# Patient Record
Sex: FEMALE | Employment: OTHER | URBAN - METROPOLITAN AREA
[De-identification: names, ages, dates, MRNs, and addresses within clinical notes are randomized per-mention and may not be internally consistent; named-entity substitution may affect disease eponyms.]

---

## 2023-12-18 DIAGNOSIS — M81.0 OSTEOPOROSIS, POSTMENOPAUSAL: Primary | ICD-10-CM

## 2023-12-18 NOTE — PROGRESS NOTES
Spoke with patient regarding Prolia injection.      Scheduled for 12/29/23, medication order request submitted.

## 2023-12-20 ENCOUNTER — LAB (OUTPATIENT)
Dept: LAB | Facility: LAB | Age: 88
End: 2023-12-20
Payer: MEDICARE

## 2023-12-20 DIAGNOSIS — M81.0 OSTEOPOROSIS, POSTMENOPAUSAL: ICD-10-CM

## 2023-12-20 LAB
ANION GAP SERPL CALC-SCNC: 13 MMOL/L (ref 10–20)
BUN SERPL-MCNC: 23 MG/DL (ref 6–23)
CALCIUM SERPL-MCNC: 9.4 MG/DL (ref 8.6–10.3)
CHLORIDE SERPL-SCNC: 101 MMOL/L (ref 98–107)
CO2 SERPL-SCNC: 29 MMOL/L (ref 21–32)
CREAT SERPL-MCNC: 0.63 MG/DL (ref 0.5–1.05)
GFR SERPL CREATININE-BSD FRML MDRD: 86 ML/MIN/1.73M*2
GLUCOSE SERPL-MCNC: 100 MG/DL (ref 74–99)
POTASSIUM SERPL-SCNC: 3.9 MMOL/L (ref 3.5–5.3)
SODIUM SERPL-SCNC: 139 MMOL/L (ref 136–145)

## 2023-12-20 PROCEDURE — 36415 COLL VENOUS BLD VENIPUNCTURE: CPT

## 2023-12-22 DIAGNOSIS — M81.0 AGE-RELATED OSTEOPOROSIS WITHOUT CURRENT PATHOLOGICAL FRACTURE: Primary | ICD-10-CM

## 2023-12-29 ENCOUNTER — CLINICAL SUPPORT (OUTPATIENT)
Dept: PRIMARY CARE | Facility: CLINIC | Age: 88
End: 2023-12-29
Payer: MEDICARE

## 2023-12-29 VITALS
HEART RATE: 68 BPM | TEMPERATURE: 98.4 F | WEIGHT: 121 LBS | OXYGEN SATURATION: 98 % | BODY MASS INDEX: 25.29 KG/M2 | DIASTOLIC BLOOD PRESSURE: 80 MMHG | SYSTOLIC BLOOD PRESSURE: 120 MMHG

## 2023-12-29 PROCEDURE — 96372 THER/PROPH/DIAG INJ SC/IM: CPT | Performed by: FAMILY MEDICINE

## 2023-12-29 RX ORDER — HYDROCHLOROTHIAZIDE 25 MG/1
25 TABLET ORAL DAILY
COMMUNITY
End: 2024-05-30

## 2024-04-11 ENCOUNTER — TELEPHONE (OUTPATIENT)
Dept: PRIMARY CARE | Facility: CLINIC | Age: 89
End: 2024-04-11
Payer: MEDICARE

## 2024-04-11 DIAGNOSIS — D64.9 ANEMIA, UNSPECIFIED TYPE: ICD-10-CM

## 2024-04-11 DIAGNOSIS — I10 PRIMARY HYPERTENSION: ICD-10-CM

## 2024-04-11 DIAGNOSIS — Z13.220 LIPID SCREENING: ICD-10-CM

## 2024-04-11 DIAGNOSIS — E55.9 VITAMIN D DEFICIENCY: ICD-10-CM

## 2024-04-11 PROBLEM — M81.0 OSTEOPOROSIS: Status: ACTIVE | Noted: 2022-05-19

## 2024-05-30 DIAGNOSIS — I10 PRIMARY HYPERTENSION: ICD-10-CM

## 2024-05-30 RX ORDER — HYDROCHLOROTHIAZIDE 25 MG/1
25 TABLET ORAL DAILY
Qty: 90 TABLET | Refills: 3 | Status: SHIPPED | OUTPATIENT
Start: 2024-05-30

## 2024-06-10 ENCOUNTER — TELEPHONE (OUTPATIENT)
Dept: PHARMACY | Facility: HOSPITAL | Age: 89
End: 2024-06-10
Payer: MEDICARE

## 2024-06-10 NOTE — TELEPHONE ENCOUNTER
LVM#1 with pt to schedule Prolia injection.    Pt has an OV with [facility] on 7/5.  Can add Prolia to that visit if pt is agreeable.

## 2024-06-21 ENCOUNTER — DOCUMENTATION (OUTPATIENT)
Dept: PHARMACY | Facility: HOSPITAL | Age: 89
End: 2024-06-21
Payer: MEDICARE

## 2024-06-21 DIAGNOSIS — M81.0 OSTEOPOROSIS, POSTMENOPAUSAL: Primary | ICD-10-CM

## 2024-06-21 NOTE — PROGRESS NOTES
PROLIA NOTES  Prolia Injection  , M81.0    Mesha Tom is a 89 y.o. female who was referred by Carey Kimbrough MD for in-office injection of Prolia.   Spoke with patiet via telephone today.  Patient verbally consented to proceed with Osteoporosis treatment/Prolia.     Calcium   Date Value Ref Range Status   12/20/2023 9.4 8.6 - 10.3 mg/dL Final     Vitamin D, 25-Hydroxy   Date Value Ref Range Status   06/23/2023 42 31 - 100 ng/mL Final     Comment:     Performed at [address] [address]     eGFR   Date Value Ref Range Status   12/20/2023 86 >60 mL/min/1.73m*2 Final     Comment:     Calculations of estimated GFR are performed using the 2021 CKD-EPI Study Refit equation without the race variable for the IDMS-Traceable creatinine methods.  https://jasn.asnjournals.org/content/early/2021/09/22/ASN.3701284169        Last DEXA scan:  03/23/2022  FINDINGS:   Lumbar T-Score: -2.3    Z- Score: 0.8  Left femoral neck T-Score: -3.9  Z- Score: -1.4  Total left hip T score: -4.2 Z score:  -1.9     10 year fracture risk as assessed by the FRAX WHO fracture risk assessment tool:    1. Risk of major osteoporotic fracture is 28%.    2. Risk of hip fracture is 14%.  (FRAX Version 3.05. Fracture probability calculated for an untreated  patient.  Fracture probability may be lower if the patient has received  treatment)     IMPRESSION:  1. Lumbar spine evaluation demonstrates osteoporosis.   There has been a 4.1  %  decrease since the prior exam.  2. The hip evaluation demonstrates osteoporosis.  There has been a 22.5 %  decrease since the prior exam.      Next dose due: 06/29/24    PA needed? No    Prolia injection via: Buy/Bill  Authorization number:    Pharmacy to be used: [facility]    Bloodwork ordered? Yes    Date: 06/21/24  Medication ordered? Yes    Date: 06/21/24  Medication Received? No    Appointment scheduled? Yes    Date: with CPE on 7/5      Other Notes:  Discussed cost, MOA, potential SE,  pre-injection bloodwork, post injection bloodwork, instructed to call office after injection with any signs of hypocalcemia.

## 2024-06-25 ENCOUNTER — LAB (OUTPATIENT)
Dept: LAB | Facility: LAB | Age: 89
End: 2024-06-25
Payer: MEDICARE

## 2024-06-25 DIAGNOSIS — M81.0 OSTEOPOROSIS, POSTMENOPAUSAL: ICD-10-CM

## 2024-06-25 DIAGNOSIS — Z13.220 LIPID SCREENING: ICD-10-CM

## 2024-06-25 DIAGNOSIS — D64.9 ANEMIA, UNSPECIFIED TYPE: ICD-10-CM

## 2024-06-25 DIAGNOSIS — E55.9 VITAMIN D DEFICIENCY: ICD-10-CM

## 2024-06-25 DIAGNOSIS — I10 PRIMARY HYPERTENSION: ICD-10-CM

## 2024-06-25 LAB
25(OH)D3 SERPL-MCNC: 31 NG/ML (ref 31–100)
ALBUMIN SERPL-MCNC: 3.5 G/DL (ref 3.5–5)
ALP BLD-CCNC: 136 U/L (ref 35–125)
ALT SERPL-CCNC: 10 U/L (ref 5–40)
ANION GAP SERPL CALC-SCNC: 9 MMOL/L
AST SERPL-CCNC: 22 U/L (ref 5–40)
BASOPHILS # BLD AUTO: 0.11 X10*3/UL (ref 0–0.1)
BASOPHILS NFR BLD AUTO: 1 %
BILIRUB SERPL-MCNC: 0.3 MG/DL (ref 0.1–1.2)
BUN SERPL-MCNC: 16 MG/DL (ref 8–25)
CALCIUM SERPL-MCNC: 9.1 MG/DL (ref 8.5–10.4)
CHLORIDE SERPL-SCNC: 99 MMOL/L (ref 97–107)
CHOLEST SERPL-MCNC: 176 MG/DL (ref 133–200)
CHOLEST/HDLC SERPL: 4.2 {RATIO}
CO2 SERPL-SCNC: 31 MMOL/L (ref 24–31)
CREAT SERPL-MCNC: 0.6 MG/DL (ref 0.4–1.6)
EGFRCR SERPLBLD CKD-EPI 2021: 86 ML/MIN/1.73M*2
EOSINOPHIL # BLD AUTO: 0.41 X10*3/UL (ref 0–0.4)
EOSINOPHIL NFR BLD AUTO: 3.8 %
ERYTHROCYTE [DISTWIDTH] IN BLOOD BY AUTOMATED COUNT: 13.2 % (ref 11.5–14.5)
GLUCOSE SERPL-MCNC: 101 MG/DL (ref 65–99)
HCT VFR BLD AUTO: 43.3 % (ref 36–46)
HDLC SERPL-MCNC: 42 MG/DL
HGB BLD-MCNC: 13.8 G/DL (ref 12–16)
IMM GRANULOCYTES # BLD AUTO: 0.06 X10*3/UL (ref 0–0.5)
IMM GRANULOCYTES NFR BLD AUTO: 0.6 % (ref 0–0.9)
LDLC SERPL CALC-MCNC: 107 MG/DL (ref 65–130)
LYMPHOCYTES # BLD AUTO: 1.92 X10*3/UL (ref 0.8–3)
LYMPHOCYTES NFR BLD AUTO: 17.8 %
MCH RBC QN AUTO: 29.9 PG (ref 26–34)
MCHC RBC AUTO-ENTMCNC: 31.9 G/DL (ref 32–36)
MCV RBC AUTO: 94 FL (ref 80–100)
MONOCYTES # BLD AUTO: 1.04 X10*3/UL (ref 0.05–0.8)
MONOCYTES NFR BLD AUTO: 9.6 %
NEUTROPHILS # BLD AUTO: 7.26 X10*3/UL (ref 1.6–5.5)
NEUTROPHILS NFR BLD AUTO: 67.2 %
NRBC BLD-RTO: 0 /100 WBCS (ref 0–0)
PLATELET # BLD AUTO: 338 X10*3/UL (ref 150–450)
POTASSIUM SERPL-SCNC: 3 MMOL/L (ref 3.4–5.1)
PROT SERPL-MCNC: 6 G/DL (ref 5.9–7.9)
RBC # BLD AUTO: 4.62 X10*6/UL (ref 4–5.2)
SODIUM SERPL-SCNC: 139 MMOL/L (ref 133–145)
TRIGL SERPL-MCNC: 134 MG/DL (ref 40–150)
WBC # BLD AUTO: 10.8 X10*3/UL (ref 4.4–11.3)

## 2024-06-25 PROCEDURE — 36415 COLL VENOUS BLD VENIPUNCTURE: CPT

## 2024-06-25 PROCEDURE — 80061 LIPID PANEL: CPT

## 2024-06-25 PROCEDURE — 80053 COMPREHEN METABOLIC PANEL: CPT

## 2024-06-25 PROCEDURE — 85025 COMPLETE CBC W/AUTO DIFF WBC: CPT

## 2024-06-25 PROCEDURE — 82306 VITAMIN D 25 HYDROXY: CPT

## 2024-07-03 PROBLEM — K27.7 CHRONIC PEPTIC ULCER WITHOUT HEMORRHAGE OR PERFORATION: Status: RESOLVED | Noted: 2018-10-26 | Resolved: 2024-07-03

## 2024-07-03 PROBLEM — B02.9 HERPES ZOSTER: Status: RESOLVED | Noted: 2019-07-08 | Resolved: 2024-07-03

## 2024-07-05 ENCOUNTER — APPOINTMENT (OUTPATIENT)
Dept: PRIMARY CARE | Facility: CLINIC | Age: 89
End: 2024-07-05
Payer: MEDICARE

## 2024-07-05 VITALS
BODY MASS INDEX: 27.29 KG/M2 | DIASTOLIC BLOOD PRESSURE: 82 MMHG | OXYGEN SATURATION: 97 % | WEIGHT: 130 LBS | HEART RATE: 68 BPM | HEIGHT: 58 IN | SYSTOLIC BLOOD PRESSURE: 162 MMHG | TEMPERATURE: 97.2 F

## 2024-07-05 DIAGNOSIS — I10 PRIMARY HYPERTENSION: ICD-10-CM

## 2024-07-05 DIAGNOSIS — E55.9 VITAMIN D DEFICIENCY: ICD-10-CM

## 2024-07-05 DIAGNOSIS — M81.0 OSTEOPOROSIS, UNSPECIFIED OSTEOPOROSIS TYPE, UNSPECIFIED PATHOLOGICAL FRACTURE PRESENCE: ICD-10-CM

## 2024-07-05 DIAGNOSIS — Z00.00 ENCOUNTER FOR ANNUAL WELLNESS EXAM IN MEDICARE PATIENT: Primary | ICD-10-CM

## 2024-07-05 PROBLEM — D64.9 ANEMIA: Status: RESOLVED | Noted: 2018-08-20 | Resolved: 2024-07-05

## 2024-07-05 LAB
POC APPEARANCE, URINE: ABNORMAL
POC BILIRUBIN, URINE: NEGATIVE
POC BLOOD, URINE: ABNORMAL
POC COLOR, URINE: YELLOW
POC GLUCOSE, URINE: NEGATIVE MG/DL
POC KETONES, URINE: NEGATIVE MG/DL
POC LEUKOCYTES, URINE: ABNORMAL
POC NITRITE,URINE: NEGATIVE
POC PH, URINE: 7 PH
POC PROTEIN, URINE: NEGATIVE MG/DL
POC SPECIFIC GRAVITY, URINE: 1.01
POC UROBILINOGEN, URINE: 0.2 EU/DL

## 2024-07-05 PROCEDURE — 3079F DIAST BP 80-89 MM HG: CPT | Performed by: FAMILY MEDICINE

## 2024-07-05 PROCEDURE — 1036F TOBACCO NON-USER: CPT | Performed by: FAMILY MEDICINE

## 2024-07-05 PROCEDURE — 99212 OFFICE O/P EST SF 10 MIN: CPT | Performed by: FAMILY MEDICINE

## 2024-07-05 PROCEDURE — 81002 URINALYSIS NONAUTO W/O SCOPE: CPT | Performed by: FAMILY MEDICINE

## 2024-07-05 PROCEDURE — G0439 PPPS, SUBSEQ VISIT: HCPCS | Performed by: FAMILY MEDICINE

## 2024-07-05 PROCEDURE — 1170F FXNL STATUS ASSESSED: CPT | Performed by: FAMILY MEDICINE

## 2024-07-05 PROCEDURE — 1159F MED LIST DOCD IN RCRD: CPT | Performed by: FAMILY MEDICINE

## 2024-07-05 PROCEDURE — 1126F AMNT PAIN NOTED NONE PRSNT: CPT | Performed by: FAMILY MEDICINE

## 2024-07-05 PROCEDURE — 1160F RVW MEDS BY RX/DR IN RCRD: CPT | Performed by: FAMILY MEDICINE

## 2024-07-05 PROCEDURE — 3077F SYST BP >= 140 MM HG: CPT | Performed by: FAMILY MEDICINE

## 2024-07-05 PROCEDURE — 96372 THER/PROPH/DIAG INJ SC/IM: CPT | Performed by: FAMILY MEDICINE

## 2024-07-05 RX ORDER — AMLODIPINE BESYLATE 5 MG/1
5 TABLET ORAL DAILY
Qty: 90 TABLET | Refills: 1 | Status: SHIPPED | OUTPATIENT
Start: 2024-07-05 | End: 2025-01-01

## 2024-07-05 ASSESSMENT — LIFESTYLE VARIABLES
3_OR_MORE_DRINKS_PER_DAY: N
CURRENT_SMOKER: N

## 2024-07-05 ASSESSMENT — ENCOUNTER SYMPTOMS
NAUSEA: 0
TROUBLE SWALLOWING: 0
UNEXPECTED WEIGHT CHANGE: 0
FEVER: 0
ABDOMINAL PAIN: 0
ARTHRALGIAS: 0
SORE THROAT: 0
DYSPHORIC MOOD: 0
RHINORRHEA: 0
NUMBNESS: 0
SHORTNESS OF BREATH: 0
CHILLS: 0
MYALGIAS: 0
DIZZINESS: 0
DYSURIA: 0
BLOOD IN STOOL: 0
VOMITING: 0
NERVOUS/ANXIOUS: 0
COUGH: 0
WEAKNESS: 0
HEMATURIA: 0

## 2024-07-05 ASSESSMENT — ACTIVITIES OF DAILY LIVING (ADL)
TAKING_MEDICATION: INDEPENDENT
DOING_HOUSEWORK: INDEPENDENT
BATHING: INDEPENDENT
MANAGING_FINANCES: INDEPENDENT
GROCERY_SHOPPING: INDEPENDENT
DRESSING: INDEPENDENT

## 2024-07-05 ASSESSMENT — PATIENT HEALTH QUESTIONNAIRE - PHQ9
2. FEELING DOWN, DEPRESSED OR HOPELESS: NOT AT ALL
SUM OF ALL RESPONSES TO PHQ9 QUESTIONS 1 AND 2: 0
1. LITTLE INTEREST OR PLEASURE IN DOING THINGS: NOT AT ALL

## 2024-07-05 ASSESSMENT — PAIN SCALES - GENERAL: PAINLEVEL: 0-NO PAIN

## 2024-07-05 NOTE — PROGRESS NOTES
"Subjective   Reason for Visit: Mesha Tom is an 89 y.o. female here for a Medicare Wellness visit.     Past Medical, Surgical, and Family History reviewed and updated in chart.    Reviewed all medications by prescribing practitioner or clinical pharmacist (such as prescriptions, OTCs, herbal therapies and supplements) and documented in the medical record.    HPI    Patient Care Team:  Carey Kimbrough MD as PCP - General (Family Medicine)   Mesha Presents for Annual Wellness Visit. PMHx, FMHx, SHx, and Social history reviewed and updated in chart. PHQ2 reviewed. Mini-cog test reviewed. Advanced Care planning reviewed.  GYN - TAHBSO secondary to DUB  HTN - stable on HCTZ, no chest pain, no palpitations, no claudication  GERD - compliant with Pepcid AC, avoiding triggers, hx stomach ulcer required surgery  OP - started on Prolia 2022, doing well without any issues    Review of Systems   Constitutional:  Negative for chills, fever and unexpected weight change.   HENT:  Negative for congestion, ear pain, hearing loss, rhinorrhea, sore throat and trouble swallowing.    Eyes:  Negative for visual disturbance.   Respiratory:  Negative for cough and shortness of breath.    Cardiovascular:  Negative for chest pain and leg swelling.   Gastrointestinal:  Negative for abdominal pain, blood in stool, nausea and vomiting.   Genitourinary:  Negative for dysuria, hematuria, vaginal bleeding and vaginal discharge.   Musculoskeletal:  Negative for arthralgias and myalgias.   Skin:  Negative for rash.   Neurological:  Negative for dizziness, weakness and numbness.   Psychiatric/Behavioral:  Negative for dysphoric mood. The patient is not nervous/anxious.        Objective   Vitals:  /82 (BP Location: Left arm, Patient Position: Sitting)   Pulse 68   Temp 36.2 °C (97.2 °F) (Temporal)   Ht 1.473 m (4' 10\")   Wt 59 kg (130 lb)   SpO2 97%   BMI 27.17 kg/m²       Physical Exam  Vitals and nursing note reviewed. "   Constitutional:       General: She is not in acute distress.  HENT:      Right Ear: Tympanic membrane and ear canal normal.      Left Ear: Tympanic membrane and ear canal normal.      Nose: Nose normal.      Mouth/Throat:      Mouth: Mucous membranes are moist.   Eyes:      Extraocular Movements: Extraocular movements intact.      Pupils: Pupils are equal, round, and reactive to light.   Neck:      Vascular: No carotid bruit.   Cardiovascular:      Rate and Rhythm: Normal rate and regular rhythm.      Heart sounds: Murmur (RUSB) heard.      Systolic murmur is present with a grade of 2/6.   Pulmonary:      Effort: Pulmonary effort is normal.      Breath sounds: Normal breath sounds.   Abdominal:      General: Abdomen is flat.      Palpations: Abdomen is soft.      Tenderness: There is no abdominal tenderness.   Musculoskeletal:         General: Normal range of motion.      Right lower leg: No edema.      Left lower leg: No edema.   Lymphadenopathy:      Cervical: No cervical adenopathy.   Skin:     General: Skin is warm.      Findings: No rash.   Neurological:      General: No focal deficit present.      Mental Status: She is alert.   Psychiatric:         Mood and Affect: Mood normal.         Assessment/Plan   Problem List Items Addressed This Visit       Primary hypertension    Current Assessment & Plan     Uncontrolled.  Continue current hydrochlorothiazide as prescribed.  Add amlodipine 5 mg daily.  Indications, benefits and potential side effects discussed with patient and daughter.  Plan to recheck in 3 months.  DASH diet. Exercise at least 4 times per week.            Relevant Medications    amLODIPine (Norvasc) 5 mg tablet    Other Relevant Orders    POCT UA (nonautomated) manually resulted (Completed)    Osteoporosis    Current Assessment & Plan     Tolerating Prolia well.  Plan to recheck DEXA.  Continue with weightbearing exercises         Relevant Orders    XR DEXA bone density axial skeleton w VFA     Vitamin D deficiency     Other Visit Diagnoses       Encounter for annual wellness exam in Medicare patient    -  Primary    Routine general medical examination at health care facility        Relevant Orders    1 Year Follow Up In Primary Care - Wellness Exam

## 2024-07-05 NOTE — ASSESSMENT & PLAN NOTE
Uncontrolled.  Continue current hydrochlorothiazide as prescribed.  Add amlodipine 5 mg daily.  Indications, benefits and potential side effects discussed with patient and daughter.  Plan to recheck in 3 months.  DASH diet. Exercise at least 4 times per week.

## 2024-07-05 NOTE — PROGRESS NOTES
Prolia Injection  , M81.0    Mesha Tom is a 89 y.o. female who was referred by Carey Kimbrough MD for in-office injection of Prolia.   Patient verbally consented to receive Prolia.      Injection given today and documented in medical record.  Patient was provided with Prolia REMS handout and instructed to call office with any signs of hypocalcemia.    Prolia injection supplied by office - medication charge IS needed     [unfilled]    Lab Results   Component Value Date    CALCIUM 9.1 06/25/2024    CALCIUM 9.4 12/20/2023    EGFR 86 06/25/2024    EGFR 86 12/20/2023         Dilma Fuentes LPN

## 2024-07-09 ENCOUNTER — HOSPITAL ENCOUNTER (OUTPATIENT)
Dept: RADIOLOGY | Facility: CLINIC | Age: 89
Discharge: HOME | End: 2024-07-09
Payer: MEDICARE

## 2024-07-09 DIAGNOSIS — M81.0 OSTEOPOROSIS, UNSPECIFIED OSTEOPOROSIS TYPE, UNSPECIFIED PATHOLOGICAL FRACTURE PRESENCE: ICD-10-CM

## 2024-07-09 PROCEDURE — 77085 DXA BONE DENSITY AXL VRT FX: CPT | Performed by: RADIOLOGY

## 2024-07-09 PROCEDURE — 77085 DXA BONE DENSITY AXL VRT FX: CPT

## 2024-07-19 ENCOUNTER — TELEPHONE (OUTPATIENT)
Dept: PRIMARY CARE | Facility: CLINIC | Age: 89
End: 2024-07-19
Payer: MEDICARE

## 2024-07-19 NOTE — TELEPHONE ENCOUNTER
Of course! She is scheduled for Monday morning. Her daughter wants to know if she should continue the BP meds over the weekend?

## 2024-07-19 NOTE — TELEPHONE ENCOUNTER
This [facility] patient was told to call if she notices any swelling after they changed her BP medicine. Her daughter Nanette [phone] called in to report her leg has been swollen for a week now.

## 2024-07-19 NOTE — TELEPHONE ENCOUNTER
Pt has appt 7/29 , due to swelling from bp meds, is she to continue the meds or stop till the appt ? Please advise  davon , daughter [phone]

## 2024-07-22 ENCOUNTER — TELEPHONE (OUTPATIENT)
Dept: PRIMARY CARE | Facility: CLINIC | Age: 89
End: 2024-07-22

## 2024-07-22 ENCOUNTER — APPOINTMENT (OUTPATIENT)
Dept: PRIMARY CARE | Facility: CLINIC | Age: 89
End: 2024-07-22
Payer: MEDICARE

## 2024-07-22 VITALS
SYSTOLIC BLOOD PRESSURE: 124 MMHG | BODY MASS INDEX: 28.01 KG/M2 | WEIGHT: 134 LBS | DIASTOLIC BLOOD PRESSURE: 62 MMHG | OXYGEN SATURATION: 98 % | HEART RATE: 74 BPM

## 2024-07-22 DIAGNOSIS — I10 PRIMARY HYPERTENSION: Primary | ICD-10-CM

## 2024-07-22 DIAGNOSIS — R60.0 BILATERAL LEG EDEMA: ICD-10-CM

## 2024-07-22 PROCEDURE — 1126F AMNT PAIN NOTED NONE PRSNT: CPT

## 2024-07-22 PROCEDURE — 1160F RVW MEDS BY RX/DR IN RCRD: CPT

## 2024-07-22 PROCEDURE — 3078F DIAST BP <80 MM HG: CPT

## 2024-07-22 PROCEDURE — 3074F SYST BP LT 130 MM HG: CPT

## 2024-07-22 PROCEDURE — 1036F TOBACCO NON-USER: CPT

## 2024-07-22 PROCEDURE — 99214 OFFICE O/P EST MOD 30 MIN: CPT

## 2024-07-22 PROCEDURE — 1159F MED LIST DOCD IN RCRD: CPT

## 2024-07-22 ASSESSMENT — PAIN SCALES - GENERAL: PAINLEVEL: 0-NO PAIN

## 2024-07-22 NOTE — TELEPHONE ENCOUNTER
FT - Hypertension follow up - 8/23/2024  Please call patient if she needs bloodwork prior to appointment.  Patient phone #: [phone]

## 2024-07-22 NOTE — ASSESSMENT & PLAN NOTE
Chronic condition, BP stable today  Discontinue amlodipine 5 mg daily as it has likely contributed to her lower leg edema.  Restart hydrochlorothiazide 25 mg by mouth daily as discussed for BP and lower extremity edema control.  Begin to monitor home blood pressures as discussed.   Report results consistently greater than 140/90 or lower than 110/50.   Prescription given for automated home BP monitor.  Follow-up in 1 month with PCP.    Orders:    Miscellaneous DME    Basic metabolic panel; Future

## 2024-07-22 NOTE — PROGRESS NOTES
Subjective     Patient ID: Mesha Tom is a 89 y.o. female who presents for Hypertension (Follow up BP, medication causing swelling.).      HPI  Mesha presents today with her daughter for hypertension follow-up and concerns for bilateral lower leg swelling.  She was seen on 7/5/2024 by her PCP for elevated blood pressure, prescribed amlodipine 5 mg by mouth daily.  She was to continue  her prescribed hydrochlorothiazide 25 mg daily, but did not realize, she has stopped taking her hydrochlorothiazide as of 7/6/2024, taking only amlodipine 5 mg by mouth daily since that.  She has held amlodipine 5 mg daily since this past Friday, 7/19/2024.  BP today 124/62.  Does not check home BPs.  Denies change in vision, headache, or dizziness.   No complaints of chest pain, palpitations, or shortness of breath.  Positive for bilateral lower leg pitting edema, slightly worse on the left.      Patient's recent visit notes, medication and allergy lists, past medical surgical social hx, immunization, vitals, problem list, recent tests were reviewed by me for pertinence to this visit.    Current Outpatient Medications:     amLODIPine (Norvasc) 5 mg tablet, Take 1 tablet (5 mg) by mouth once daily. (Patient not taking: Reported on 7/22/2024), Disp: 90 tablet, Rfl: 1    hydroCHLOROthiazide (HYDRODiuril) 25 mg tablet, Take 1 tablet by mouth once daily (Patient not taking: Reported on 7/22/2024), Disp: 90 tablet, Rfl: 3      Review of Systems  All other systems have been reviewed and are negative except as noted in the HPI.         Objective   /62   Pulse 74   Wt 60.8 kg (134 lb)   SpO2 98%   BMI 28.01 kg/m²       Physical Exam  Vitals and nursing note reviewed.   Constitutional:       General: She is not in acute distress.     Appearance: Normal appearance.   Neck:      Vascular: No carotid bruit.   Cardiovascular:      Rate and Rhythm: Normal rate and regular rhythm.      Pulses: Normal pulses.      Heart sounds: Normal  heart sounds, S1 normal and S2 normal. No murmur heard.  Pulmonary:      Effort: Pulmonary effort is normal.      Breath sounds: Normal breath sounds and air entry.   Musculoskeletal:      Cervical back: Normal range of motion and neck supple.      Right lower le+ Pitting Edema present.      Left lower le+ Pitting Edema present.   Lymphadenopathy:      Cervical: No cervical adenopathy.   Skin:     General: Skin is warm and dry.   Neurological:      General: No focal deficit present.      Mental Status: She is alert. Mental status is at baseline.   Psychiatric:         Behavior: Behavior is cooperative.             Assessment & Plan  Primary hypertension  Chronic condition, BP stable today  Discontinue amlodipine 5 mg daily as it has likely contributed to her lower leg edema.  Restart hydrochlorothiazide 25 mg by mouth daily as discussed for BP and lower extremity edema control.  Begin to monitor home blood pressures as discussed.   Report results consistently greater than 140/90 or lower than 110/50.   Prescription given for automated home BP monitor.  Follow-up in 1 month with PCP.    Orders:    Miscellaneous DME    Basic metabolic panel; Future    Bilateral leg edema  Restart hydrochlorothiazide 25 mg by mouth daily  May continue to use compression stockings to bilateral lower legs as discussed with daughter.  Elevate legs whenever sitting.  Most recent labs reviewed, will have her repeat in 1 month  Orders:    Basic metabolic panel; Future          Patient and family understand and agree with treatment plan.    Theresa Brasher, JORGE LUIS-CNP

## 2024-07-29 ENCOUNTER — APPOINTMENT (OUTPATIENT)
Dept: PRIMARY CARE | Facility: CLINIC | Age: 89
End: 2024-07-29
Payer: MEDICARE

## 2024-08-16 ENCOUNTER — LAB (OUTPATIENT)
Dept: LAB | Facility: LAB | Age: 89
End: 2024-08-16
Payer: MEDICARE

## 2024-08-16 DIAGNOSIS — R60.0 BILATERAL LEG EDEMA: ICD-10-CM

## 2024-08-16 DIAGNOSIS — I10 PRIMARY HYPERTENSION: ICD-10-CM

## 2024-08-16 LAB
ANION GAP SERPL CALC-SCNC: 12 MMOL/L
BUN SERPL-MCNC: 29 MG/DL (ref 8–25)
CALCIUM SERPL-MCNC: 8.9 MG/DL (ref 8.5–10.4)
CHLORIDE SERPL-SCNC: 102 MMOL/L (ref 97–107)
CO2 SERPL-SCNC: 25 MMOL/L (ref 24–31)
CREAT SERPL-MCNC: 0.6 MG/DL (ref 0.4–1.6)
EGFRCR SERPLBLD CKD-EPI 2021: 86 ML/MIN/1.73M*2
GLUCOSE SERPL-MCNC: 94 MG/DL (ref 65–99)
POTASSIUM SERPL-SCNC: 4.2 MMOL/L (ref 3.4–5.1)
SODIUM SERPL-SCNC: 139 MMOL/L (ref 133–145)

## 2024-08-16 PROCEDURE — 80048 BASIC METABOLIC PNL TOTAL CA: CPT

## 2024-08-16 PROCEDURE — 36415 COLL VENOUS BLD VENIPUNCTURE: CPT

## 2024-08-19 ASSESSMENT — ENCOUNTER SYMPTOMS
SHORTNESS OF BREATH: 0
PND: 0
ORTHOPNEA: 0
HEADACHES: 0
PALPITATIONS: 0
HYPERTENSION: 1
BLURRED VISION: 0
NECK PAIN: 0
SWEATS: 0

## 2024-08-23 ENCOUNTER — APPOINTMENT (OUTPATIENT)
Dept: PRIMARY CARE | Facility: CLINIC | Age: 89
End: 2024-08-23
Payer: MEDICARE

## 2024-08-23 VITALS
WEIGHT: 122 LBS | SYSTOLIC BLOOD PRESSURE: 132 MMHG | OXYGEN SATURATION: 98 % | BODY MASS INDEX: 25.5 KG/M2 | DIASTOLIC BLOOD PRESSURE: 78 MMHG | HEART RATE: 66 BPM

## 2024-08-23 DIAGNOSIS — I10 PRIMARY HYPERTENSION: Primary | ICD-10-CM

## 2024-08-23 PROCEDURE — 1036F TOBACCO NON-USER: CPT | Performed by: FAMILY MEDICINE

## 2024-08-23 PROCEDURE — 1126F AMNT PAIN NOTED NONE PRSNT: CPT | Performed by: FAMILY MEDICINE

## 2024-08-23 PROCEDURE — 1159F MED LIST DOCD IN RCRD: CPT | Performed by: FAMILY MEDICINE

## 2024-08-23 PROCEDURE — 3075F SYST BP GE 130 - 139MM HG: CPT | Performed by: FAMILY MEDICINE

## 2024-08-23 PROCEDURE — 99213 OFFICE O/P EST LOW 20 MIN: CPT | Performed by: FAMILY MEDICINE

## 2024-08-23 PROCEDURE — 3078F DIAST BP <80 MM HG: CPT | Performed by: FAMILY MEDICINE

## 2024-08-23 ASSESSMENT — ENCOUNTER SYMPTOMS
ORTHOPNEA: 0
HYPERTENSION: 1
SHORTNESS OF BREATH: 0
HEADACHES: 0
NECK PAIN: 0
PALPITATIONS: 0
BLURRED VISION: 0
PND: 0
SWEATS: 0

## 2024-08-23 ASSESSMENT — PAIN SCALES - GENERAL: PAINLEVEL: 0-NO PAIN

## 2024-08-23 NOTE — PROGRESS NOTES
Subjective   Patient ID: Mesha Tom is a 89 y.o. female who presents for Hypertension.    Hypertension  This is a chronic problem. The current episode started more than 1 year ago. The problem has been waxing and waning since onset. The problem is controlled. Associated symptoms include peripheral edema. Pertinent negatives include no anxiety, blurred vision, chest pain, headaches, malaise/fatigue, neck pain, orthopnea, palpitations, PND, shortness of breath or sweats. There are no associated agents to hypertension. There are no known risk factors for coronary artery disease. There are no compliance problems.        Review of Systems   Constitutional:  Negative for malaise/fatigue.   Eyes:  Negative for blurred vision.   Respiratory:  Negative for shortness of breath.    Cardiovascular:  Negative for chest pain, palpitations, orthopnea and PND.   Musculoskeletal:  Negative for neck pain.   Neurological:  Negative for headaches.       Objective   Vital Signs:  /78   Pulse 66   Wt 55.3 kg (122 lb)   SpO2 98%   BMI 25.50 kg/m²     Physical Exam  Vitals and nursing note reviewed.   Constitutional:       Appearance: Normal appearance.   Eyes:      Extraocular Movements: Extraocular movements intact.      Conjunctiva/sclera: Conjunctivae normal.      Pupils: Pupils are equal, round, and reactive to light.   Cardiovascular:      Rate and Rhythm: Normal rate and regular rhythm.   Pulmonary:      Effort: Pulmonary effort is normal.      Breath sounds: Normal breath sounds.   Skin:     Findings: No rash.   Neurological:      General: No focal deficit present.      Mental Status: She is alert.   Psychiatric:         Mood and Affect: Mood normal.         Assessment & Plan  Primary hypertension  Controlled.  Continue current hctz as prescribed.  DASH diet. Exercise at least 4 times per week.              Follow up 6 MONTHS, sooner with any problems or concerns.

## 2024-08-23 NOTE — ASSESSMENT & PLAN NOTE
Controlled.  Continue current hctz as prescribed.  DASH diet. Exercise at least 4 times per week.

## 2024-10-07 ENCOUNTER — APPOINTMENT (OUTPATIENT)
Dept: PRIMARY CARE | Facility: CLINIC | Age: 89
End: 2024-10-07
Payer: MEDICARE

## 2025-01-07 ENCOUNTER — DOCUMENTATION (OUTPATIENT)
Dept: PHARMACY | Facility: CLINIC | Age: 89
End: 2025-01-07

## 2025-01-07 DIAGNOSIS — M81.0 OSTEOPOROSIS, POSTMENOPAUSAL: Primary | ICD-10-CM

## 2025-01-07 NOTE — PROGRESS NOTES
PROLIA NOTES  Prolia Injection  , M81.0    Mesha Tom is a 89 y.o. female who was referred by Carey Kimbrough MD for in-office injection of Prolia.   Spoke with patiet via telephone today.  Patient verbally consented to proceed with Osteoporosis treatment/Prolia.     Calcium   Date Value Ref Range Status   08/16/2024 8.9 8.5 - 10.4 mg/dL Final     Vitamin D, 25-Hydroxy, Total   Date Value Ref Range Status   06/25/2024 31 31 - 100 ng/mL Final     eGFR   Date Value Ref Range Status   08/16/2024 86 >60 mL/min/1.73m*2 Final     Comment:     Calculations of estimated GFR are performed using the 2021 CKD-EPI Study Refit equation without the race variable for the IDMS-Traceable creatinine methods.  https://jasn.asnjournals.org/content/early/2021/09/22/ASN.1131353044        Last DEXA scan:  07/05/2024    FINDINGS:   SPINE L1-L4  Bone Mineral Density: 0.783 g/cm2  T-Score -2.4  Z-Score 0.5  Bone Mineral Density change vs baseline:  7.6 %  Bone Mineral Density change vs previous: 12.2 %      LEFT FEMUR -TOTAL  Bone Mineral Density: 0.553 g/cm2  T-Score -3.2   Z-Score  -0.9  Bone Mineral Density change vs baseline: -0.6 %  Bone Mineral Density change vs previous: 28.2 %      LEFT FEMUR -NECK  Bone Mineral Density: 0.51 g/cm2  T-Score -3.1  Z-Score -0.5          World Health Organization (WHO) criteria for post-menopausal,   Women:  Normal:         T-score at or above -1 SD  Osteopenia:   T-score between -1 and -2.5 SD  Osteoporosis: T-score at or below -2.5 SD          10-year Fracture Risk(1):  Major Osteoporotic Fracture  27 %  Hip Fracture                        10 %    Next dose due: 01/22/25    PA needed? No    Prolia injection via: Buy/Bill  Authorization number:    Pharmacy to be used: [facility]    Bloodwork ordered? Yes    Date: 01/07/25  Medication ordered? Yes    Date: 01/07/25    Appointment scheduled? Yes    Date: 01/22/25      Other Notes:  Discussed cost, MOA, potential SE, pre-injection  bloodwork, post injection bloodwork, instructed to call office after injection with any signs of hypocalcemia.

## 2025-01-10 ENCOUNTER — TELEPHONE (OUTPATIENT)
Dept: PRIMARY CARE | Facility: CLINIC | Age: 89
End: 2025-01-10
Payer: MEDICARE

## 2025-01-10 DIAGNOSIS — I10 PRIMARY HYPERTENSION: ICD-10-CM

## 2025-01-10 DIAGNOSIS — D64.9 ANEMIA, UNSPECIFIED TYPE: ICD-10-CM

## 2025-01-10 DIAGNOSIS — E55.9 VITAMIN D DEFICIENCY: ICD-10-CM

## 2025-01-22 ENCOUNTER — APPOINTMENT (OUTPATIENT)
Dept: PRIMARY CARE | Facility: CLINIC | Age: 89
End: 2025-01-22
Payer: MEDICARE

## 2025-01-22 VITALS — SYSTOLIC BLOOD PRESSURE: 110 MMHG | DIASTOLIC BLOOD PRESSURE: 68 MMHG | TEMPERATURE: 97 F

## 2025-01-22 PROCEDURE — 96372 THER/PROPH/DIAG INJ SC/IM: CPT | Performed by: FAMILY MEDICINE

## 2025-01-22 NOTE — PROGRESS NOTES
Prolia Injection  , M81.0    Mesha Tom is a 89 y.o. female who was referred by Carey Kimbrough MD for in-office injection of Prolia.   Patient verbally consented to receive Prolia.      Injection given today and documented in medical record.  Patient was provided with Prolia REMS handout and instructed to call office with any signs of hypocalcemia.    Prolia injection supplied by office - medication charge IS needed       Lab Results   Component Value Date    CALCIUM 8.9 08/16/2024    CALCIUM 9.1 06/25/2024    EGFR 86 08/16/2024    EGFR 86 06/25/2024         Maria E Euceda MA

## 2025-05-19 DIAGNOSIS — I10 PRIMARY HYPERTENSION: ICD-10-CM

## 2025-05-19 RX ORDER — HYDROCHLOROTHIAZIDE 25 MG/1
25 TABLET ORAL DAILY
Qty: 90 TABLET | Refills: 0 | Status: SHIPPED | OUTPATIENT
Start: 2025-05-19

## 2025-06-24 ENCOUNTER — DOCUMENTATION (OUTPATIENT)
Dept: PHARMACY | Facility: HOSPITAL | Age: 89
End: 2025-06-24
Payer: MEDICARE

## 2025-06-24 DIAGNOSIS — M81.0 OSTEOPOROSIS, POSTMENOPAUSAL: Primary | ICD-10-CM

## 2025-06-24 NOTE — PROGRESS NOTES
PROLIA NOTES  Prolia Injection  , M81.0    Mesha Tom is a 89 y.o. female who was referred by Carey Kimbrough MD for in-office injection of Prolia.   Spoke with patient via telephone today.  Patient verbally consented to proceed with Osteoporosis treatment/Prolia.     Calcium   Date Value Ref Range Status   08/16/2024 8.9 8.5 - 10.4 mg/dL Final     Vitamin D, 25-Hydroxy, Total   Date Value Ref Range Status   06/25/2024 31 31 - 100 ng/mL Final     eGFR   Date Value Ref Range Status   08/16/2024 86 >60 mL/min/1.73m*2 Final     Comment:     Calculations of estimated GFR are performed using the 2021 CKD-EPI Study Refit equation without the race variable for the IDMS-Traceable creatinine methods.  https://jasn.asnjournals.org/content/early/2021/09/22/ASN.5970468551        Last DEXA scan:  07/05/2024    FINDINGS:   SPINE L1-L4  Bone Mineral Density: 0.783 g/cm2  T-Score -2.4  Z-Score 0.5  Bone Mineral Density change vs baseline:  7.6 %  Bone Mineral Density change vs previous: 12.2 %      LEFT FEMUR -TOTAL  Bone Mineral Density: 0.553 g/cm2  T-Score -3.2   Z-Score  -0.9  Bone Mineral Density change vs baseline: -0.6 %  Bone Mineral Density change vs previous: 28.2 %      LEFT FEMUR -NECK  Bone Mineral Density: 0.51 g/cm2  T-Score -3.1  Z-Score -0.5          World Health Organization (WHO) criteria for post-menopausal,   Women:  Normal:         T-score at or above -1 SD  Osteopenia:   T-score between -1 and -2.5 SD  Osteoporosis: T-score at or below -2.5 SD          10-year Fracture Risk(1):  Major Osteoporotic Fracture  27 %  Hip Fracture                        10 %    Next dose due: 07/07/25    PA needed? No    Prolia injection via: Buy/Bill  Authorization number: not needed     Pharmacy to be used: [facility]    Appointment scheduled? Yes, will complete at CPE with PCP on 7/7/25      Other Notes:  Discussed cost, MOA, potential SE, pre-injection bloodwork, post injection bloodwork, instructed to call  office after injection with any signs of hypocalcemia.     Kisha Murdock  [facility]  Clinical Pharmacy Specialist  Certified [facility] and   [place] [id number] [facility] [address] [place], [place] [id number] [phone]

## 2025-07-02 LAB
25(OH)D3+25(OH)D2 SERPL-MCNC: 58 NG/ML (ref 30–100)
ALBUMIN SERPL-MCNC: 3.6 G/DL (ref 3.6–5.1)
ALP SERPL-CCNC: 108 U/L (ref 37–153)
ALT SERPL-CCNC: 24 U/L (ref 6–29)
ANION GAP SERPL CALCULATED.4IONS-SCNC: 13 MMOL/L (CALC) (ref 7–17)
AST SERPL-CCNC: 27 U/L (ref 10–35)
BASOPHILS # BLD AUTO: 88 CELLS/UL (ref 0–200)
BASOPHILS NFR BLD AUTO: 1 %
BILIRUB SERPL-MCNC: 0.3 MG/DL (ref 0.2–1.2)
BUN SERPL-MCNC: 18 MG/DL (ref 7–25)
CALCIUM SERPL-MCNC: 9.2 MG/DL (ref 8.6–10.4)
CHLORIDE SERPL-SCNC: 102 MMOL/L (ref 98–110)
CHOLEST SERPL-MCNC: 158 MG/DL
CHOLEST/HDLC SERPL: 4.2 (CALC)
CO2 SERPL-SCNC: 26 MMOL/L (ref 20–32)
CREAT SERPL-MCNC: 0.66 MG/DL (ref 0.6–0.95)
EGFRCR SERPLBLD CKD-EPI 2021: 84 ML/MIN/1.73M2
EOSINOPHIL # BLD AUTO: 352 CELLS/UL (ref 15–500)
EOSINOPHIL NFR BLD AUTO: 4 %
ERYTHROCYTE [DISTWIDTH] IN BLOOD BY AUTOMATED COUNT: 13.3 % (ref 11–15)
GLUCOSE SERPL-MCNC: 109 MG/DL (ref 65–99)
HCT VFR BLD AUTO: 46.8 % (ref 35–45)
HDLC SERPL-MCNC: 38 MG/DL
HGB BLD-MCNC: 14.7 G/DL (ref 11.7–15.5)
LDLC SERPL CALC-MCNC: 93 MG/DL (CALC)
LYMPHOCYTES # BLD AUTO: 2402 CELLS/UL (ref 850–3900)
LYMPHOCYTES NFR BLD AUTO: 27.3 %
MCH RBC QN AUTO: 29.7 PG (ref 27–33)
MCHC RBC AUTO-ENTMCNC: 31.4 G/DL (ref 32–36)
MCV RBC AUTO: 94.5 FL (ref 80–100)
MONOCYTES # BLD AUTO: 968 CELLS/UL (ref 200–950)
MONOCYTES NFR BLD AUTO: 11 %
NEUTROPHILS # BLD AUTO: 4990 CELLS/UL (ref 1500–7800)
NEUTROPHILS NFR BLD AUTO: 56.7 %
NONHDLC SERPL-MCNC: 120 MG/DL (CALC)
PLATELET # BLD AUTO: 352 THOUSAND/UL (ref 140–400)
PMV BLD REES-ECKER: 10.4 FL (ref 7.5–12.5)
POTASSIUM SERPL-SCNC: 3.9 MMOL/L (ref 3.5–5.3)
PROT SERPL-MCNC: 6.2 G/DL (ref 6.1–8.1)
RBC # BLD AUTO: 4.95 MILLION/UL (ref 3.8–5.1)
SODIUM SERPL-SCNC: 141 MMOL/L (ref 135–146)
TRIGL SERPL-MCNC: 178 MG/DL
WBC # BLD AUTO: 8.8 THOUSAND/UL (ref 3.8–10.8)

## 2025-07-06 NOTE — PROGRESS NOTES
"Subjective   Reason for Visit: Mesha Tom is an 89 y.o. female here for a Medicare Wellness visit.     Past Medical, Surgical, and Family History reviewed and updated in chart.    Reviewed all medications by prescribing practitioner or clinical pharmacist (such as prescriptions, OTCs, herbal therapies and supplements) and documented in the medical record.    HPI    Patient Care Team:  Carey Kimbrough MD as PCP - General (Family Medicine)   Mesha Tom is seen for comprehensive physical exam.  PMH, PSH, family history and social history were reviewed and updated.  GYN - TAHBSO secondary to DUB  HTN - stable on HCTZ, no chest pain, no palpitations, no claudication  GERD - compliant with Pepcid AC, avoiding triggers, hx stomach ulcer required surgery  OP - started on Prolia 2022, doing well without any issues    Review of Systems   Constitutional:  Negative for chills, fever and unexpected weight change.   HENT:  Negative for congestion, ear pain, hearing loss, rhinorrhea, sore throat and trouble swallowing.    Eyes:  Negative for visual disturbance.   Respiratory:  Negative for cough and shortness of breath.    Cardiovascular:  Negative for chest pain and leg swelling.   Gastrointestinal:  Negative for abdominal pain, blood in stool, nausea and vomiting.   Genitourinary:  Negative for dysuria, hematuria, vaginal bleeding and vaginal discharge.   Musculoskeletal:  Negative for arthralgias and myalgias.   Skin:  Negative for rash.   Neurological:  Negative for dizziness, weakness and numbness.   Psychiatric/Behavioral:  Negative for dysphoric mood. The patient is not nervous/anxious.        Objective   Vitals:  /76 (BP Location: Left arm, Patient Position: Sitting)   Pulse 74   Resp 17   Ht (!) 1.461 m (4' 9.5\")   Wt 52.2 kg (115 lb)   SpO2 96%   BMI 24.45 kg/m²     Wt Readings from Last 3 Encounters:   07/07/25 52.2 kg (115 lb)   08/23/24 55.3 kg (122 lb)   07/22/24 60.8 kg (134 lb)       Physical " Exam  Vitals and nursing note reviewed.   Constitutional:       General: She is not in acute distress.  HENT:      Right Ear: Tympanic membrane and ear canal normal.      Left Ear: Tympanic membrane and ear canal normal.      Nose: Nose normal.      Mouth/Throat:      Mouth: Mucous membranes are moist.   Eyes:      Extraocular Movements: Extraocular movements intact.      Conjunctiva/sclera: Conjunctivae normal.      Pupils: Pupils are equal, round, and reactive to light.   Neck:      Vascular: No carotid bruit.   Cardiovascular:      Rate and Rhythm: Normal rate and regular rhythm.      Pulses:           Dorsalis pedis pulses are 2+ on the right side and 2+ on the left side.   Pulmonary:      Effort: Pulmonary effort is normal.      Breath sounds: Normal breath sounds.   Abdominal:      General: Abdomen is flat. Bowel sounds are normal.      Palpations: Abdomen is soft.      Tenderness: There is no abdominal tenderness.   Musculoskeletal:         General: Normal range of motion.      Cervical back: Neck supple.      Right lower leg: No edema.      Left lower leg: No edema.   Lymphadenopathy:      Cervical: No cervical adenopathy.   Skin:     General: Skin is warm.   Neurological:      General: No focal deficit present.      Mental Status: She is alert.   Psychiatric:         Mood and Affect: Mood normal.         Assessment/Plan   Assessment & Plan  Well adult exam  Preventative measures discussed in detail.  Immunizations reviewed and discussed.  Reviewed labs with patient.  Orders:    1 Year Follow Up In Primary Care - Wellness Exam    Primary hypertension  Controlled.  Continue current medication as prescribed.  DASH diet. Exercise at least 4 times per week.     Orders:    POCT UA Automated manually resulted    ECG 12 lead (Clinic Performed)    hydroCHLOROthiazide (HYDRODiuril) 25 mg tablet; Take 1 tablet (25 mg) by mouth once daily.    Age-related osteoporosis without current pathological fracture  Continue with  Prolia       Vitamin D deficiency  Improved with dietary changes.         Follow up 1 Year, sooner with any problems or concerns.

## 2025-07-06 NOTE — ASSESSMENT & PLAN NOTE
Controlled.  Continue current medication as prescribed.  DASH diet. Exercise at least 4 times per week.     Orders:    POCT UA Automated manually resulted    ECG 12 lead (Clinic Performed)    hydroCHLOROthiazide (HYDRODiuril) 25 mg tablet; Take 1 tablet (25 mg) by mouth once daily.

## 2025-07-07 ENCOUNTER — APPOINTMENT (OUTPATIENT)
Dept: PRIMARY CARE | Facility: CLINIC | Age: 89
End: 2025-07-07
Payer: MEDICARE

## 2025-07-07 VITALS
SYSTOLIC BLOOD PRESSURE: 116 MMHG | RESPIRATION RATE: 17 BRPM | WEIGHT: 115 LBS | HEART RATE: 74 BPM | BODY MASS INDEX: 24.14 KG/M2 | HEIGHT: 58 IN | OXYGEN SATURATION: 96 % | DIASTOLIC BLOOD PRESSURE: 76 MMHG

## 2025-07-07 DIAGNOSIS — Z00.00 WELL ADULT EXAM: Primary | ICD-10-CM

## 2025-07-07 DIAGNOSIS — M81.0 AGE-RELATED OSTEOPOROSIS WITHOUT CURRENT PATHOLOGICAL FRACTURE: ICD-10-CM

## 2025-07-07 DIAGNOSIS — E55.9 VITAMIN D DEFICIENCY: ICD-10-CM

## 2025-07-07 DIAGNOSIS — I10 PRIMARY HYPERTENSION: ICD-10-CM

## 2025-07-07 LAB
POC APPEARANCE, URINE: CLEAR
POC BILIRUBIN, URINE: NEGATIVE
POC BLOOD, URINE: NEGATIVE
POC COLOR, URINE: YELLOW
POC GLUCOSE, URINE: NEGATIVE MG/DL
POC KETONES, URINE: NEGATIVE MG/DL
POC LEUKOCYTES, URINE: ABNORMAL
POC NITRITE,URINE: POSITIVE
POC PH, URINE: 5.5 PH
POC PROTEIN, URINE: NEGATIVE MG/DL
POC SPECIFIC GRAVITY, URINE: 1.02
POC UROBILINOGEN, URINE: 1 EU/DL

## 2025-07-07 RX ORDER — HYDROCHLOROTHIAZIDE 25 MG/1
25 TABLET ORAL DAILY
Qty: 90 TABLET | Refills: 0 | Status: SHIPPED | OUTPATIENT
Start: 2025-07-07

## 2025-07-07 ASSESSMENT — ENCOUNTER SYMPTOMS
DYSPHORIC MOOD: 0
SHORTNESS OF BREATH: 0
NERVOUS/ANXIOUS: 0
SORE THROAT: 0
DIZZINESS: 0
DYSURIA: 0
RHINORRHEA: 0
COUGH: 0
NUMBNESS: 0
CHILLS: 0
HEMATURIA: 0
FEVER: 0
ARTHRALGIAS: 0
NAUSEA: 0
MYALGIAS: 0
ABDOMINAL PAIN: 0
UNEXPECTED WEIGHT CHANGE: 0
TROUBLE SWALLOWING: 0
WEAKNESS: 0
VOMITING: 0
BLOOD IN STOOL: 0

## 2025-07-07 ASSESSMENT — ACTIVITIES OF DAILY LIVING (ADL)
BATHING: NEEDS ASSISTANCE
MANAGING_FINANCES: NEEDS ASSISTANCE
DRESSING: NEEDS ASSISTANCE
TAKING_MEDICATION: NEEDS ASSISTANCE
DOING_HOUSEWORK: NEEDS ASSISTANCE
GROCERY_SHOPPING: NEEDS ASSISTANCE

## 2025-07-07 ASSESSMENT — PAIN SCALES - GENERAL: PAINLEVEL_OUTOF10: 0-NO PAIN

## 2025-07-07 ASSESSMENT — COLUMBIA-SUICIDE SEVERITY RATING SCALE - C-SSRS: 1. IN THE PAST MONTH, HAVE YOU WISHED YOU WERE DEAD OR WISHED YOU COULD GO TO SLEEP AND NOT WAKE UP?: NO

## 2025-07-07 ASSESSMENT — LIFESTYLE VARIABLES
AUDIT-C TOTAL SCORE: 0
HOW OFTEN DO YOU HAVE A DRINK CONTAINING ALCOHOL: NEVER
SKIP TO QUESTIONS 9-10: 1
HOW OFTEN DO YOU HAVE SIX OR MORE DRINKS ON ONE OCCASION: NEVER
HOW MANY STANDARD DRINKS CONTAINING ALCOHOL DO YOU HAVE ON A TYPICAL DAY: PATIENT DOES NOT DRINK

## 2025-07-07 NOTE — PROGRESS NOTES
Prolia Injection  , M81.0    Mesha Tom is a 89 y.o. female who was referred by Carey Kimbrough MD for in-office injection of Prolia.   Patient verbally consented to receive Prolia.      Injection given today and documented in medical record.  Patient was provided with Prolia [facility] handout and instructed to call office with any signs of hypocalcemia.    Prolia injection supplied by office - medication charge IS needed       Lab Results   Component Value Date    CALCIUM 9.2 07/01/2025    CALCIUM 8.9 08/16/2024    EGFR 84 07/01/2025    EGFR 86 08/16/2024         JOSÉ VALENTINO. DALTON MATUTE LPN

## 2025-07-21 DIAGNOSIS — M81.0 OSTEOPOROSIS, POSTMENOPAUSAL: ICD-10-CM

## 2026-01-05 ENCOUNTER — APPOINTMENT (OUTPATIENT)
Dept: PRIMARY CARE | Facility: CLINIC | Age: OVER 89
End: 2026-01-05
Payer: MEDICARE